# Patient Record
Sex: FEMALE | Race: WHITE | NOT HISPANIC OR LATINO | URBAN - METROPOLITAN AREA
[De-identification: names, ages, dates, MRNs, and addresses within clinical notes are randomized per-mention and may not be internally consistent; named-entity substitution may affect disease eponyms.]

---

## 2020-11-12 ENCOUNTER — OFFICE VISIT (OUTPATIENT)
Dept: URBAN - METROPOLITAN AREA CLINIC 50 | Facility: CLINIC | Age: 45
End: 2020-11-12
Payer: COMMERCIAL

## 2020-11-12 ENCOUNTER — LAB OUTSIDE AN ENCOUNTER (OUTPATIENT)
Dept: URBAN - METROPOLITAN AREA CLINIC 96 | Facility: CLINIC | Age: 45
End: 2020-11-12

## 2020-11-12 ENCOUNTER — WEB ENCOUNTER (OUTPATIENT)
Dept: URBAN - METROPOLITAN AREA CLINIC 50 | Facility: CLINIC | Age: 45
End: 2020-11-12

## 2020-11-12 VITALS
DIASTOLIC BLOOD PRESSURE: 77 MMHG | WEIGHT: 141 LBS | HEART RATE: 71 BPM | HEIGHT: 67 IN | TEMPERATURE: 98.2 F | BODY MASS INDEX: 22.13 KG/M2 | SYSTOLIC BLOOD PRESSURE: 115 MMHG

## 2020-11-12 DIAGNOSIS — R19.4 CHANGE IN BOWEL HABITS: ICD-10-CM

## 2020-11-12 DIAGNOSIS — K52.9 COLITIS: ICD-10-CM

## 2020-11-12 PROCEDURE — 99213 OFFICE O/P EST LOW 20 MIN: CPT | Performed by: INTERNAL MEDICINE

## 2020-11-12 NOTE — HPI-OTHER HISTORIES
45 y.o. WF PCP Kathleen In January, changed sleep d/o stimulant - w/ change, skin much better Still takes daily histamine Bowels were better after changing sleep medicine too In past few months, had 2 episodes where bowels took her out of daily activities - once due to bloating, urgency, and fever for 12 hrs; also, few weeks back, 3 days where didn't feel well which were GI symptoms Prior, had been doing quite well Bowels daily to twice daily - different every single time - color, smell, consistency No blood in stools No nocturnal syx Had routine physical this summer w/ pcp and again at work and gyn annual - labs and everything have been good Mind keeps coming back to doing the 2 medicines (Entocort / Apriso) from last year that did for ~ 8-12 weeks Veggies seem to give her the biggest issue

## 2020-11-14 LAB — CALPROTECTIN, STOOL - QDX: (no result)

## 2020-11-15 ENCOUNTER — WEB ENCOUNTER (OUTPATIENT)
Dept: URBAN - METROPOLITAN AREA CLINIC 92 | Facility: CLINIC | Age: 45
End: 2020-11-15

## 2020-11-15 RX ORDER — MESALAMINE 1.2 G/1
4 TABLETS TABLET, DELAYED RELEASE ORAL ONCE A DAY
Qty: 360 TABLET | Refills: 1 | OUTPATIENT

## 2020-11-15 RX ORDER — BUDESONIDE 3 MG/1
2 CAPSULES CAPSULE ORAL ONCE A DAY
Qty: 180 CAPSULE | Refills: 0 | OUTPATIENT

## 2020-11-16 ENCOUNTER — WEB ENCOUNTER (OUTPATIENT)
Dept: URBAN - METROPOLITAN AREA CLINIC 50 | Facility: CLINIC | Age: 45
End: 2020-11-16

## 2021-03-02 ENCOUNTER — TELEPHONE ENCOUNTER (OUTPATIENT)
Dept: URBAN - METROPOLITAN AREA CLINIC 98 | Facility: CLINIC | Age: 46
End: 2021-03-02

## 2021-03-02 RX ORDER — BUDESONIDE 3 MG/1
TAKE 3 CAPSULES (9 MG) BY ORAL ROUTE ONCE DAILY IN THE MORNING FOR UP TO 8 WEEKS FOR 60 DAYS CAPSULE, COATED PELLETS ORAL 1
Refills: 0
Start: 2019-08-05

## 2021-03-09 ENCOUNTER — TELEPHONE ENCOUNTER (OUTPATIENT)
Dept: URBAN - METROPOLITAN AREA CLINIC 98 | Facility: CLINIC | Age: 46
End: 2021-03-09

## 2021-03-11 ENCOUNTER — WEB ENCOUNTER (OUTPATIENT)
Dept: URBAN - METROPOLITAN AREA CLINIC 92 | Facility: CLINIC | Age: 46
End: 2021-03-11

## 2021-03-11 LAB
A/G RATIO: 1.7
ALBUMIN: 4.5
ALKALINE PHOSPHATASE: 54
ALT (SGPT): 10
AST (SGOT): 10
BASO (ABSOLUTE): 0.1
BASOS: 1
BILIRUBIN, TOTAL: 0.4
BUN/CREATININE RATIO: 14
BUN: 13
C-REACTIVE PROTEIN, QUANT: <1
CALCIUM: 9.6
CARBON DIOXIDE, TOTAL: 23
CHLORIDE: 105
CREATININE: 0.9
EGFR IF AFRICN AM: 91
EGFR IF NONAFRICN AM: 79
EOS (ABSOLUTE): 0.1
EOS: 2
GLOBULIN, TOTAL: 2.6
GLUCOSE: 70
HEMATOCRIT: 41
HEMATOLOGY COMMENTS:: (no result)
HEMOGLOBIN: 13.7
IMMATURE CELLS: (no result)
IMMATURE GRANS (ABS): 0
IMMATURE GRANULOCYTES: 0
LYMPHS (ABSOLUTE): 1.6
LYMPHS: 34
MCH: 32.6
MCHC: 33.4
MCV: 98
MONOCYTES(ABSOLUTE): 0.4
MONOCYTES: 9
NEUTROPHILS (ABSOLUTE): 2.6
NEUTROPHILS: 54
NRBC: (no result)
PLATELETS: 193
POTASSIUM: 4.1
PROTEIN, TOTAL: 7.1
RBC: 4.2
RDW: 11.9
SODIUM: 142
VITAMIN D, 25-HYDROXY: 23
WBC: 4.8

## 2021-03-11 RX ORDER — ERGOCALCIFEROL 1.25 MG/1
1 CAPSULE CAPSULE ORAL
Qty: 12 | Refills: 0 | OUTPATIENT
End: 2021-06-03

## 2021-03-12 ENCOUNTER — LAB OUTSIDE AN ENCOUNTER (OUTPATIENT)
Dept: URBAN - METROPOLITAN AREA CLINIC 96 | Facility: CLINIC | Age: 46
End: 2021-03-12

## 2021-03-15 LAB
CALPROTECTIN, STOOL - QDX: (no result)
GASTROINTESTINAL PATHOGEN: (no result)

## 2021-03-16 ENCOUNTER — WEB ENCOUNTER (OUTPATIENT)
Dept: URBAN - METROPOLITAN AREA CLINIC 50 | Facility: CLINIC | Age: 46
End: 2021-03-16

## 2021-06-10 ENCOUNTER — TELEPHONE ENCOUNTER (OUTPATIENT)
Dept: URBAN - METROPOLITAN AREA CLINIC 98 | Facility: CLINIC | Age: 46
End: 2021-06-10

## 2021-08-23 ENCOUNTER — OFFICE VISIT (OUTPATIENT)
Dept: URBAN - METROPOLITAN AREA CLINIC 96 | Facility: CLINIC | Age: 46
End: 2021-08-23
Payer: COMMERCIAL

## 2021-08-23 VITALS
HEIGHT: 67 IN | BODY MASS INDEX: 21.5 KG/M2 | WEIGHT: 137 LBS | HEART RATE: 68 BPM | SYSTOLIC BLOOD PRESSURE: 133 MMHG | TEMPERATURE: 98 F | DIASTOLIC BLOOD PRESSURE: 96 MMHG

## 2021-08-23 DIAGNOSIS — R19.5 ABNORMAL STOOL TEST: ICD-10-CM

## 2021-08-23 DIAGNOSIS — K52.9 COLITIS: ICD-10-CM

## 2021-08-23 PROCEDURE — 99214 OFFICE O/P EST MOD 30 MIN: CPT | Performed by: INTERNAL MEDICINE

## 2021-08-23 RX ORDER — MESALAMINE 1.2 G/1
4 TABLETS TABLET, DELAYED RELEASE ORAL ONCE A DAY
Qty: 360 TABLET | Refills: 1 | Status: DISCONTINUED | COMMUNITY

## 2021-08-23 RX ORDER — BUDESONIDE 3 MG/1
2 CAPSULES CAPSULE ORAL ONCE A DAY
Qty: 180 CAPSULE | Refills: 0 | Status: DISCONTINUED | COMMUNITY

## 2021-08-23 NOTE — HPI-TODAY'S VISIT:
45 y.o. WF Remembers a while ago having blood in stools, but no other stool issue (no diarrhea, abd pain, etc.) Then, came to see me b/c of odd skin stuff -- did repeat c-scope -- no polyp, but didn't look good Since then, not sure where we stand Have done various meds Only gets in touch w/ me when not doing well Last 3 yrs gut just hasn't been optimal Wonders if has IBD or not Since end of July, worst ever been - not sure if flare or not Months of lower abd bloating like in an innertube Has had explosive stooling w/ cramps which is very painful Cramps improve w/ bm Has had a period of low grade fever, but just 36 hrs (neg Covid) Have backed diet down to very bland and slowly things improved No blood in stool During flare, stools did wake from sleep; not in past 2 weeks Oddly enough, skin is better

## 2021-08-25 LAB
A/G RATIO: 1.7
ALBUMIN: 4.7
ALKALINE PHOSPHATASE: 86
ALT (SGPT): 10
AST (SGOT): 12
BASO (ABSOLUTE): 0.1
BASOS: 1
BILIRUBIN, TOTAL: 0.4
BUN/CREATININE RATIO: 13
BUN: 12
C-REACTIVE PROTEIN, QUANT: 2
CALCIUM: 9.9
CARBON DIOXIDE, TOTAL: 23
CHLORIDE: 104
CREATININE: 0.9
EGFR IF AFRICN AM: 91
EGFR IF NONAFRICN AM: 79
EOS (ABSOLUTE): 0.1
EOS: 2
GLOBULIN, TOTAL: 2.7
GLUCOSE: 74
HBSAG SCREEN: NEGATIVE
HEMATOCRIT: 39.6
HEMATOLOGY COMMENTS:: (no result)
HEMOGLOBIN: 13.4
HEP A AB, IGM: NEGATIVE
HEP B CORE AB, IGM: NEGATIVE
HEP C VIRUS AB: <0.1
IMMATURE CELLS: (no result)
IMMATURE GRANS (ABS): 0
IMMATURE GRANULOCYTES: 0
LYMPHS (ABSOLUTE): 1.5
LYMPHS: 27
MCH: 32.7
MCHC: 33.8
MCV: 97
MONOCYTES(ABSOLUTE): 0.4
MONOCYTES: 6
NEUTROPHILS (ABSOLUTE): 3.7
NEUTROPHILS: 64
NRBC: (no result)
PLATELETS: 263
POTASSIUM: 4.9
PROTEIN, TOTAL: 7.4
QUANTIFERON CRITERIA: (no result)
QUANTIFERON INCUBATION: (no result)
QUANTIFERON MITOGEN VALUE: >10
QUANTIFERON NIL VALUE: 0.09
QUANTIFERON TB1 AG VALUE: 0.08
QUANTIFERON TB2 AG VALUE: 0.12
QUANTIFERON-TB GOLD PLUS: NEGATIVE
RBC: 4.1
RDW: 11.7
SODIUM: 140
WBC: 5.7

## 2021-08-27 ENCOUNTER — WEB ENCOUNTER (OUTPATIENT)
Dept: URBAN - METROPOLITAN AREA CLINIC 96 | Facility: CLINIC | Age: 46
End: 2021-08-27

## 2022-01-12 ENCOUNTER — WEB ENCOUNTER (OUTPATIENT)
Dept: URBAN - METROPOLITAN AREA CLINIC 50 | Facility: CLINIC | Age: 47
End: 2022-01-12

## 2022-01-13 ENCOUNTER — WEB ENCOUNTER (OUTPATIENT)
Dept: URBAN - METROPOLITAN AREA CLINIC 50 | Facility: CLINIC | Age: 47
End: 2022-01-13

## 2022-01-19 ENCOUNTER — DASHBOARD ENCOUNTERS (OUTPATIENT)
Age: 47
End: 2022-01-19

## 2022-01-19 ENCOUNTER — WEB ENCOUNTER (OUTPATIENT)
Dept: URBAN - METROPOLITAN AREA CLINIC 50 | Facility: CLINIC | Age: 47
End: 2022-01-19

## 2025-02-11 ENCOUNTER — WEB ENCOUNTER (OUTPATIENT)
Dept: URBAN - METROPOLITAN AREA CLINIC 96 | Facility: CLINIC | Age: 50
End: 2025-02-11

## 2025-02-11 ENCOUNTER — WEB ENCOUNTER (OUTPATIENT)
Dept: URBAN - METROPOLITAN AREA CLINIC 50 | Facility: CLINIC | Age: 50
End: 2025-02-11

## 2025-03-11 ENCOUNTER — LAB OUTSIDE AN ENCOUNTER (OUTPATIENT)
Dept: URBAN - METROPOLITAN AREA CLINIC 50 | Facility: CLINIC | Age: 50
End: 2025-03-11

## 2025-03-11 ENCOUNTER — OFFICE VISIT (OUTPATIENT)
Dept: URBAN - METROPOLITAN AREA CLINIC 50 | Facility: CLINIC | Age: 50
End: 2025-03-11
Payer: COMMERCIAL

## 2025-03-11 VITALS
TEMPERATURE: 99 F | DIASTOLIC BLOOD PRESSURE: 79 MMHG | HEIGHT: 67 IN | WEIGHT: 132 LBS | RESPIRATION RATE: 18 BRPM | SYSTOLIC BLOOD PRESSURE: 109 MMHG | BODY MASS INDEX: 20.72 KG/M2 | HEART RATE: 85 BPM

## 2025-03-11 DIAGNOSIS — R19.8 ABNORMAL BOWEL MOVEMENT: ICD-10-CM

## 2025-03-11 DIAGNOSIS — R93.3 ABNORMAL CT SCAN, COLON: ICD-10-CM

## 2025-03-11 DIAGNOSIS — K52.9 COLITIS: ICD-10-CM

## 2025-03-11 DIAGNOSIS — R14.0 ABDOMINAL BLOATING: ICD-10-CM

## 2025-03-11 DIAGNOSIS — R11.0 NAUSEA: ICD-10-CM

## 2025-03-11 PROCEDURE — 99244 OFF/OP CNSLTJ NEW/EST MOD 40: CPT | Performed by: PHYSICIAN ASSISTANT

## 2025-03-11 PROCEDURE — 99204 OFFICE O/P NEW MOD 45 MIN: CPT | Performed by: PHYSICIAN ASSISTANT

## 2025-03-11 NOTE — PHYSICAL EXAM EYES:
Conjuntivae and eyelids appear normal, Sclerae : White without injection Heather in Intake Unit notified of eta

## 2025-03-11 NOTE — PHYSICAL EXAM GASTROINTESTINAL
Abdomen , soft, generalized mild tender, nondistended , no guarding or rigidity , no masses palpable , normal bowel sounds , Liver and Spleen,  no hepatosplenomegaly , liver nontender

## 2025-03-11 NOTE — HPI-TODAY'S VISIT:
50 yo F of Dr. Rodriguez here for discussion on recent CT results Back 2021, unspecific colitis noted Was worried on starting entyvio, deferred at that time as overall felt well Recently saw surgery Dr. Ermias Torres to check gallbladder - recommended unlikely 2/2 gallbladder, had a CT, and recommended GI consultation Recent CT w colitis involving descending colon and sigmoid colon, diff consider infecious vs inflammatory etiology Beleives symptoms have progressively worsened in ptas 3 months, not seen normal stool in months Intermittent nausea, bloating, upper discomfort, occ vomiting, no dysphagia Abd pains/cramping intermittent in past 1-2 months, generalized At present, noticing more lower pain, "moving" sensation, like gas or stool Appetite lowr, 5 lb loss pats few months BMs 2-5x/day, no blood/mucus/melena, but lots of urge/soft/explosive/diarrhea Using peptobismol, causes stools to be darker, doesn't really help Admits noctural diarrhea Occ joint pains, no rashes rashes Did second opinoin w Dr. Valdez, had stool studies revealing

## 2025-03-12 ENCOUNTER — TELEPHONE ENCOUNTER (OUTPATIENT)
Dept: URBAN - METROPOLITAN AREA CLINIC 50 | Facility: CLINIC | Age: 50
End: 2025-03-12

## 2025-03-13 ENCOUNTER — WEB ENCOUNTER (OUTPATIENT)
Dept: URBAN - METROPOLITAN AREA CLINIC 50 | Facility: CLINIC | Age: 50
End: 2025-03-13

## 2025-03-14 ENCOUNTER — LAB OUTSIDE AN ENCOUNTER (OUTPATIENT)
Dept: URBAN - METROPOLITAN AREA CLINIC 50 | Facility: CLINIC | Age: 50
End: 2025-03-14

## 2025-03-17 LAB
ALBUMIN: 4
ALKALINE PHOSPHATASE: 86
ALT (SGPT): 13
AST (SGOT): 11
BILIRUBIN, TOTAL: 0.2
BUN/CREATININE RATIO: 12
BUN: 9
C-REACTIVE PROTEIN, QUANT: 4
CALCIUM: 9.2
CARBON DIOXIDE, TOTAL: 22
CHLORIDE: 105
CREATININE: 0.78
EGFR: 94
FERRITIN, SERUM: 13
FOLATE (FOLIC ACID), SERUM: 15.2
GLOBULIN, TOTAL: 2.7
GLUCOSE: 91
HBSAG SCREEN: NEGATIVE
HEMATOCRIT: 34.1
HEMOGLOBIN: 11.2
IRON BIND.CAP.(TIBC): 352
IRON SATURATION: 13
IRON: 46
MCH: 30.8
MCHC: 32.8
MCV: 94
NRBC: (no result)
PLATELETS: 343
POTASSIUM: 4.4
PROTEIN, TOTAL: 6.7
RBC: 3.64
RDW: 11.6
SODIUM: 142
UIBC: 306
VITAMIN B12: 586
WBC: 5.3

## 2025-03-22 ENCOUNTER — WEB ENCOUNTER (OUTPATIENT)
Dept: URBAN - METROPOLITAN AREA CLINIC 50 | Facility: CLINIC | Age: 50
End: 2025-03-22

## 2025-03-28 ENCOUNTER — WEB ENCOUNTER (OUTPATIENT)
Dept: URBAN - METROPOLITAN AREA CLINIC 96 | Facility: CLINIC | Age: 50
End: 2025-03-28

## 2025-03-28 ENCOUNTER — OFFICE VISIT (OUTPATIENT)
Dept: URBAN - METROPOLITAN AREA SURGERY CENTER 18 | Facility: SURGERY CENTER | Age: 50
End: 2025-03-28

## 2025-04-01 ENCOUNTER — WEB ENCOUNTER (OUTPATIENT)
Dept: URBAN - METROPOLITAN AREA CLINIC 96 | Facility: CLINIC | Age: 50
End: 2025-04-01

## 2025-04-01 RX ORDER — MESALAMINE 1.2 G/1
2 TABLETS WITH A MEAL TABLET, DELAYED RELEASE ORAL ONCE A DAY
Qty: 60 | Refills: 0 | OUTPATIENT
Start: 2025-04-01

## 2025-04-01 RX ORDER — PANTOPRAZOLE SODIUM 40 MG/1
1 TABLET 1/2 TO 1 HOUR BEFORE MORNING MEAL TABLET, DELAYED RELEASE ORAL ONCE A DAY
Qty: 30 | Refills: 0 | OUTPATIENT
Start: 2025-04-01

## 2025-04-01 RX ORDER — PREDNISONE 20 MG/1
2 TABS TABLET ORAL
Qty: 20 | Refills: 0 | OUTPATIENT
Start: 2025-04-01

## 2025-04-02 ENCOUNTER — WEB ENCOUNTER (OUTPATIENT)
Dept: URBAN - METROPOLITAN AREA CLINIC 96 | Facility: CLINIC | Age: 50
End: 2025-04-02

## 2025-04-14 ENCOUNTER — OFFICE VISIT (OUTPATIENT)
Dept: URBAN - METROPOLITAN AREA CLINIC 50 | Facility: CLINIC | Age: 50
End: 2025-04-14
Payer: COMMERCIAL

## 2025-04-14 DIAGNOSIS — K51.00 ULCERATIVE PANCOLITIS: ICD-10-CM

## 2025-04-14 DIAGNOSIS — R19.8 ABNORMAL BOWEL MOVEMENT: ICD-10-CM

## 2025-04-14 DIAGNOSIS — R93.3 ABNORMAL CT SCAN, COLON: ICD-10-CM

## 2025-04-14 DIAGNOSIS — R14.0 ABDOMINAL BLOATING: ICD-10-CM

## 2025-04-14 PROBLEM — 444548001: Status: ACTIVE | Noted: 2025-04-14

## 2025-04-14 PROCEDURE — 99214 OFFICE O/P EST MOD 30 MIN: CPT | Performed by: PHYSICIAN ASSISTANT

## 2025-04-14 RX ORDER — MESALAMINE 1.2 G/1
2 TABLETS WITH A MEAL TABLET, DELAYED RELEASE ORAL ONCE A DAY
Qty: 60 | Refills: 0 | Status: ACTIVE | COMMUNITY

## 2025-04-14 RX ORDER — RISANKIZUMAB-RZAA 60 MG/ML
AS DIRECTED INJECTION INTRAVENOUS
Qty: 1200 | Refills: 0 | OUTPATIENT
Start: 2025-04-14 | End: 2025-04-14

## 2025-04-14 RX ORDER — PANTOPRAZOLE SODIUM 40 MG/1
1 TABLET 1/2 TO 1 HOUR BEFORE MORNING MEAL TABLET, DELAYED RELEASE ORAL ONCE A DAY
Qty: 30 | Refills: 0 | Status: ACTIVE | COMMUNITY

## 2025-04-14 RX ORDER — PREDNISONE 20 MG/1
2 TABS TABLET ORAL
Qty: 20 | Refills: 0 | Status: ON HOLD | COMMUNITY

## 2025-04-14 NOTE — HPI-TODAY'S VISIT:
4/14/25: 48 y/o F here f/u recent colonoscopy Did about a week of the prednisone, on mesalamine and pantoprazole Now feeling somewaht better Still some days up and down, but mostly better Appetite good, no nausea/vomiting Still occ cramping/discomfort/bloating BMs now 1x/day, no blood/mucus/melena, still occ urgency/explosive Nocturnal syx still present , waking w gassiness/cramping intermittently and to go to bathroom, not every night Bilateral hand pain at times, no rashes -- 3/11/25: 50 yo F of Dr. Rodriguez here for discussion on recent CT results Back 2021, unspecific colitis noted Was worried on starting entyvio, deferred at that time as overall felt well Recently saw surgery Dr. Ermias Torres to check gallbladder - recommended unlikely 2/2 gallbladder, had a CT, and recommended GI consultation Recent CT w colitis involving descending colon and sigmoid colon, diff consider infecious vs inflammatory etiology Beleives symptoms have progressively worsened in ptas 3 months, not seen normal stool in months Intermittent nausea, bloating, upper discomfort, occ vomiting, no dysphagia Abd pains/cramping intermittent in past 1-2 months, generalized At present, noticing more lower pain, "moving" sensation, like gas or stool Appetite lowr, 5 lb loss pats few months BMs 2-5x/day, no blood/mucus/melena, but lots of urge/soft/explosive/diarrhea Using peptobismol, causes stools to be darker, doesn't really help Admits noctural diarrhea Occ joint pains, no rashes rashes Did second opinoin w Dr. Valdez, had stool studies

## 2025-04-23 ENCOUNTER — OFFICE VISIT (OUTPATIENT)
Dept: URBAN - METROPOLITAN AREA SURGERY CENTER 18 | Facility: SURGERY CENTER | Age: 50
End: 2025-04-23

## 2025-04-29 ENCOUNTER — OFFICE VISIT (OUTPATIENT)
Dept: URBAN - METROPOLITAN AREA CLINIC 97 | Facility: CLINIC | Age: 50
End: 2025-04-29

## 2025-04-29 RX ORDER — MESALAMINE 1.2 G/1
2 TABLETS WITH A MEAL TABLET, DELAYED RELEASE ORAL ONCE A DAY
Qty: 60 | Refills: 0 | Status: ACTIVE | COMMUNITY

## 2025-04-29 RX ORDER — PANTOPRAZOLE SODIUM 40 MG/1
1 TABLET 1/2 TO 1 HOUR BEFORE MORNING MEAL TABLET, DELAYED RELEASE ORAL ONCE A DAY
Qty: 30 | Refills: 0 | Status: ACTIVE | COMMUNITY

## 2025-04-29 RX ORDER — PREDNISONE 20 MG/1
2 TABS TABLET ORAL
Qty: 20 | Refills: 0 | Status: ON HOLD | COMMUNITY

## 2025-05-02 ENCOUNTER — TELEPHONE ENCOUNTER (OUTPATIENT)
Dept: URBAN - METROPOLITAN AREA CLINIC 50 | Facility: CLINIC | Age: 50
End: 2025-05-02

## 2025-05-27 ENCOUNTER — OFFICE VISIT (OUTPATIENT)
Dept: URBAN - METROPOLITAN AREA CLINIC 97 | Facility: CLINIC | Age: 50
End: 2025-05-27
Payer: COMMERCIAL

## 2025-05-27 DIAGNOSIS — K51.00 ULCERATIVE PANCOLITIS: ICD-10-CM

## 2025-05-27 PROCEDURE — 96413 CHEMO IV INFUSION 1 HR: CPT | Performed by: INTERNAL MEDICINE

## 2025-05-27 PROCEDURE — 96415 CHEMO IV INFUSION ADDL HR: CPT | Performed by: INTERNAL MEDICINE

## 2025-05-27 RX ORDER — PANTOPRAZOLE SODIUM 40 MG/1
1 TABLET 1/2 TO 1 HOUR BEFORE MORNING MEAL TABLET, DELAYED RELEASE ORAL ONCE A DAY
Qty: 30 | Refills: 0 | Status: ACTIVE | COMMUNITY

## 2025-05-27 RX ORDER — MESALAMINE 1.2 G/1
2 TABLETS WITH A MEAL TABLET, DELAYED RELEASE ORAL ONCE A DAY
Qty: 60 | Refills: 0 | Status: ACTIVE | COMMUNITY

## 2025-05-27 RX ORDER — PREDNISONE 20 MG/1
2 TABS TABLET ORAL
Qty: 20 | Refills: 0 | Status: ON HOLD | COMMUNITY

## 2025-06-03 ENCOUNTER — TELEPHONE ENCOUNTER (OUTPATIENT)
Dept: URBAN - METROPOLITAN AREA CLINIC 50 | Facility: CLINIC | Age: 50
End: 2025-06-03

## 2025-06-24 ENCOUNTER — OFFICE VISIT (OUTPATIENT)
Dept: URBAN - METROPOLITAN AREA CLINIC 97 | Facility: CLINIC | Age: 50
End: 2025-06-24
Payer: COMMERCIAL

## 2025-06-24 DIAGNOSIS — K51.00 ULCERATIVE PANCOLITIS: ICD-10-CM

## 2025-06-24 PROCEDURE — 96415 CHEMO IV INFUSION ADDL HR: CPT | Performed by: INTERNAL MEDICINE

## 2025-06-24 PROCEDURE — 96413 CHEMO IV INFUSION 1 HR: CPT | Performed by: INTERNAL MEDICINE

## 2025-06-24 RX ORDER — PREDNISONE 20 MG/1
2 TABS TABLET ORAL
Qty: 20 | Refills: 0 | Status: ON HOLD | COMMUNITY

## 2025-06-24 RX ORDER — MESALAMINE 1.2 G/1
2 TABLETS WITH A MEAL TABLET, DELAYED RELEASE ORAL ONCE A DAY
Qty: 60 | Refills: 0 | Status: ACTIVE | COMMUNITY

## 2025-06-24 RX ORDER — PANTOPRAZOLE SODIUM 40 MG/1
1 TABLET 1/2 TO 1 HOUR BEFORE MORNING MEAL TABLET, DELAYED RELEASE ORAL ONCE A DAY
Qty: 30 | Refills: 0 | Status: ACTIVE | COMMUNITY

## 2025-07-17 ENCOUNTER — OFFICE VISIT (OUTPATIENT)
Dept: URBAN - METROPOLITAN AREA CLINIC 50 | Facility: CLINIC | Age: 50
End: 2025-07-17
Payer: COMMERCIAL

## 2025-07-17 DIAGNOSIS — K51.00 ULCERATIVE PANCOLITIS: ICD-10-CM

## 2025-07-17 DIAGNOSIS — R93.3 ABNORMAL CT SCAN, COLON: ICD-10-CM

## 2025-07-17 DIAGNOSIS — R14.0 ABDOMINAL BLOATING: ICD-10-CM

## 2025-07-17 DIAGNOSIS — R19.4 ALTERATION IN BOWEL ELIMINATION: ICD-10-CM

## 2025-07-17 PROCEDURE — 99214 OFFICE O/P EST MOD 30 MIN: CPT | Performed by: PHYSICIAN ASSISTANT

## 2025-07-17 RX ORDER — PANTOPRAZOLE SODIUM 40 MG/1
1 TABLET 1/2 TO 1 HOUR BEFORE MORNING MEAL TABLET, DELAYED RELEASE ORAL ONCE A DAY
Qty: 30 | Refills: 0 | Status: ON HOLD | COMMUNITY

## 2025-07-17 RX ORDER — MESALAMINE 1.2 G/1
2 TABLETS WITH A MEAL TABLET, DELAYED RELEASE ORAL ONCE A DAY
Qty: 60 | Refills: 0 | Status: ON HOLD | COMMUNITY

## 2025-07-17 RX ORDER — PREDNISONE 20 MG/1
2 TABS TABLET ORAL
Qty: 20 | Refills: 0 | Status: ON HOLD | COMMUNITY

## 2025-07-17 NOTE — HPI-TODAY'S VISIT:
7/17/25: 50 y/o F here f/u Skyrizi induction First OBI planned next week - infusions were really simple Dropped mesalamine and pantoprazole in interval  Improvement so far is quite modest - not as bad as Jan/Feb but still not good No nausea/vomiting, no current cramping/bloating/discomfort now which is good BMs 1-2x/day, no blood/mucus/melena, still urgency, explosive though - not improved w this Still occ nocturnal syx, waking w urge at nighttime still depending on what eating - worsens w any food more exciting than chicken and rice No indigestion/reflux/heartburn  Wonders if nutrition consult would help -- 4/14/25: 50 y/o F here f/u recent colonoscopy Did about a week of the prednisone, on mesalamine and pantoprazole Now feeling somewaht better Still some days up and down, but mostly better Appetite good, no nausea/vomiting Still occ cramping/discomfort/bloating BMs now 1x/day, no blood/mucus/melena, still occ urgency/explosive Nocturnal syx still present , waking w gassiness/cramping intermittently and to go to bathroom, not every night Bilateral hand pain at times, no rashes -- 3/11/25: 50 yo F of Dr. Rodriguez here for discussion on recent CT results Back 2021, unspecific colitis noted Was worried on starting entyvio, deferred at that time as overall felt well Recently saw surgery Dr. Ermias Torres to check gallbladder - recommended unlikely 2/2 gallbladder, had a CT, and recommended GI consultation Recent CT w colitis involving descending colon and sigmoid colon, diff consider infecious vs inflammatory etiology [facility] symptoms have progressively worsened in ptas 3 months, not seen normal stool in months Intermittent nausea, bloating, upper discomfort, occ vomiting, no dysphagia Abd pains/cramping intermittent in past 1-2 months, generalized At present, noticing more lower pain, "moving" sensation, like gas or stool Appetite lowr, 5 lb loss pats few months BMs 2-5x/day, no blood/mucus/melena, but lots of urge/soft/explosive/diarrhea Using peptobismol, causes stools to be darker, doesn't really help Admits noctural diarrhea Occ joint pains, no rashes rashes Did second opinoin w Dr. Valdez, had stool studies

## 2025-07-22 ENCOUNTER — WEB ENCOUNTER (OUTPATIENT)
Dept: URBAN - METROPOLITAN AREA CLINIC 50 | Facility: CLINIC | Age: 50
End: 2025-07-22

## 2025-08-06 ENCOUNTER — LAB OUTSIDE AN ENCOUNTER (OUTPATIENT)
Dept: URBAN - METROPOLITAN AREA CLINIC 50 | Facility: CLINIC | Age: 50
End: 2025-08-06

## 2025-08-11 LAB — CALPROTECTIN, STOOL - QDX: (no result)

## 2025-08-12 ENCOUNTER — TELEPHONE ENCOUNTER (OUTPATIENT)
Dept: URBAN - METROPOLITAN AREA CLINIC 50 | Facility: CLINIC | Age: 50
End: 2025-08-12

## 2025-08-12 RX ORDER — BUDESONIDE 3 MG/1
USE AS DIRECTED FOR 3 MONTHS CAPSULE ORAL ONCE A DAY
Qty: 168 | Refills: 0 | OUTPATIENT
Start: 2025-08-13

## 2025-08-12 RX ORDER — MESALAMINE 1.2 G/1
4 TABLETS WITH A MEAL TABLET, DELAYED RELEASE ORAL ONCE A DAY
Qty: 360 TABLET | Refills: 1 | OUTPATIENT
Start: 2025-08-13

## 2025-08-13 ENCOUNTER — WEB ENCOUNTER (OUTPATIENT)
Dept: URBAN - METROPOLITAN AREA CLINIC 50 | Facility: CLINIC | Age: 50
End: 2025-08-13